# Patient Record
Sex: FEMALE | Race: WHITE | NOT HISPANIC OR LATINO | Employment: FULL TIME | URBAN - METROPOLITAN AREA
[De-identification: names, ages, dates, MRNs, and addresses within clinical notes are randomized per-mention and may not be internally consistent; named-entity substitution may affect disease eponyms.]

---

## 2024-11-24 ENCOUNTER — HOSPITAL ENCOUNTER (EMERGENCY)
Facility: HOSPITAL | Age: 64
Discharge: HOME | End: 2024-11-24
Attending: FAMILY MEDICINE
Payer: COMMERCIAL

## 2024-11-24 VITALS
HEIGHT: 66 IN | SYSTOLIC BLOOD PRESSURE: 142 MMHG | WEIGHT: 165 LBS | DIASTOLIC BLOOD PRESSURE: 85 MMHG | HEART RATE: 94 BPM | OXYGEN SATURATION: 99 % | TEMPERATURE: 98 F | RESPIRATION RATE: 18 BRPM | BODY MASS INDEX: 26.52 KG/M2

## 2024-11-24 DIAGNOSIS — S61.311A LACERATION OF LEFT INDEX FINGER WITHOUT FOREIGN BODY WITH DAMAGE TO NAIL, INITIAL ENCOUNTER: Primary | ICD-10-CM

## 2024-11-24 PROCEDURE — 12001 RPR S/N/AX/GEN/TRNK 2.5CM/<: CPT

## 2024-11-24 PROCEDURE — 63600175 PHARM REV CODE 636 W HCPCS: Performed by: FAMILY MEDICINE

## 2024-11-24 PROCEDURE — 90471 IMMUNIZATION ADMIN: CPT | Performed by: FAMILY MEDICINE

## 2024-11-24 PROCEDURE — 99284 EMERGENCY DEPT VISIT MOD MDM: CPT | Mod: 25

## 2024-11-24 PROCEDURE — 90715 TDAP VACCINE 7 YRS/> IM: CPT | Performed by: FAMILY MEDICINE

## 2024-11-24 RX ADMIN — TETANUS TOXOID, REDUCED DIPHTHERIA TOXOID AND ACELLULAR PERTUSSIS VACCINE, ADSORBED 0.5 ML: 5; 2.5; 8; 8; 2.5 SUSPENSION INTRAMUSCULAR at 03:11

## 2024-11-24 NOTE — ED NOTES
Lt index finger laceration after using a - small area noted w jagged  skin tear looking laceration- skin flapped over in place- clean/irrigated w ns then skin adhesive applied per dr de los santos.  Then dressing w finger splint  Pt updated on td while here.  Has cap ref ill <2secs/ + sensation /skin pink./warm, +rom of ltindex finger.- aluminum foam finger splint applied to finger afrer

## 2024-11-24 NOTE — ED PROVIDER NOTES
Encounter Date: 11/24/2024       History     Chief Complaint   Patient presents with    Laceration     Laceration to L index finger from a .       Pt is 65yo F with PMH listed below who presents for cut on her L pointer finger. Pt reports that 30mins ago, she was trimming the hedges in her yard and got distracted and went to grab the handle but accidentally grabbed the blade and cut her finger. Pt denies numbness, tingling in finger.       Review of patient's allergies indicates:  No Known Allergies  No past medical history on file.  No past surgical history on file.  No family history on file.     Review of Systems   All other systems reviewed and are negative.      Physical Exam     Initial Vitals [11/24/24 1515]   BP Pulse Resp Temp SpO2   (!) 142/85 94 18 98.3 °F (36.8 °C) 99 %      MAP       --         Physical Exam    Constitutional: She appears well-developed and well-nourished.   HENT:   Head: Normocephalic and atraumatic.   Eyes: EOM are normal.   Neck:   Normal range of motion.  Musculoskeletal:         General: Normal range of motion.      Right hand: Laceration and tenderness present. Normal range of motion.      Cervical back: Normal range of motion.      Comments: Stellate laceration over lateral PIP of second digit on L hand     Neurological: She is alert.   Skin: Skin is warm and dry.   Psychiatric: She has a normal mood and affect. Her behavior is normal. Thought content normal.         ED Course   Lac Repair    Date/Time: 11/24/2024 3:33 PM    Performed by: Natalia Hines MD  Authorized by: Natalia Hines MD    Consent:     Consent obtained:  Verbal    Consent given by:  Patient    Risks, benefits, and alternatives were discussed: yes      Risks discussed:  Infection, pain, poor cosmetic result and need for additional repair    Alternatives discussed:  No treatment and observation  Universal protocol:     Procedure explained and questions answered to patient or proxy's satisfaction:  yes      Relevant documents present and verified: yes      Patient identity confirmed:  Verbally with patient  Anesthesia:     Anesthesia method:  None  Laceration details:     Location:  Finger    Finger location:  L index finger    Length (cm):  1    Depth (mm):  2  Exploration:     Limited defect created (wound extended): no      Hemostasis achieved with:  Direct pressure    Imaging outcome: foreign body not noted      Wound exploration: wound explored through full range of motion and entire depth of wound visualized      Contaminated: no    Treatment:     Area cleansed with:  Saline    Amount of cleaning:  Standard    Irrigation solution:  Sterile saline    Irrigation method:  Syringe    Debridement:  None    Undermining:  None  Skin repair:     Repair method:  Tissue adhesive  Approximation:     Approximation:  Close  Repair type:     Repair type:  Simple  Post-procedure details:     Dressing:  Non-adherent dressing    Procedure completion:  Tolerated well, no immediate complications    Labs Reviewed - No data to display       Imaging Results    None          Medications   Tdap (BOOSTRIX) vaccine injection 0.5 mL (0.5 mLs Intramuscular Given 11/24/24 1528)     Medical Decision Making  Overall 63 yo F with finger laceration. On PE, pt with 1cm stellate laceration on second digit of L hand with minimal bleeding.  Differential Diagnosis:   Trauma to finger  ED Management:  VSSAF  On PE, 1cm stellate laceration on second digit of L hand .  Pt appears to be in no distress   Hemostasis was achieved with pressure. Wound was irrigated with sterile saline and edges were approximated with tissue glue. Wound was then covered with a sterile dressing and splint was applied. Pt tolerated procedure well. Tdap shot was given.  Pt decline pain medication.  Return precautions discussed and follow up with PCP is recommended    Risk  Prescription drug management.                                      Clinical Impression:  Final  diagnoses:  [S6.819S] Laceration of left index finger without foreign body with damage to nail, initial encounter (Primary)          ED Disposition Condition    Discharge Stable          ED Prescriptions    None       Follow-up Information    None          Natalia Hines MD  11/24/24 5304